# Patient Record
Sex: FEMALE | Race: WHITE | NOT HISPANIC OR LATINO
[De-identification: names, ages, dates, MRNs, and addresses within clinical notes are randomized per-mention and may not be internally consistent; named-entity substitution may affect disease eponyms.]

---

## 2018-10-22 ENCOUNTER — TRANSCRIPTION ENCOUNTER (OUTPATIENT)
Age: 43
End: 2018-10-22

## 2020-09-11 ENCOUNTER — APPOINTMENT (OUTPATIENT)
Dept: ANTEPARTUM | Facility: CLINIC | Age: 45
End: 2020-09-11
Payer: COMMERCIAL

## 2020-09-11 PROCEDURE — 76819 FETAL BIOPHYS PROFIL W/O NST: CPT

## 2020-09-11 PROCEDURE — 76816 OB US FOLLOW-UP PER FETUS: CPT

## 2020-09-18 ENCOUNTER — APPOINTMENT (OUTPATIENT)
Dept: ANTEPARTUM | Facility: CLINIC | Age: 45
End: 2020-09-18
Payer: COMMERCIAL

## 2020-09-18 PROCEDURE — 76819 FETAL BIOPHYS PROFIL W/O NST: CPT

## 2020-09-18 PROCEDURE — 76816 OB US FOLLOW-UP PER FETUS: CPT

## 2020-09-25 ENCOUNTER — APPOINTMENT (OUTPATIENT)
Dept: ANTEPARTUM | Facility: CLINIC | Age: 45
End: 2020-09-25
Payer: COMMERCIAL

## 2020-09-25 PROCEDURE — 76816 OB US FOLLOW-UP PER FETUS: CPT

## 2020-09-25 PROCEDURE — 76819 FETAL BIOPHYS PROFIL W/O NST: CPT

## 2020-10-02 ENCOUNTER — APPOINTMENT (OUTPATIENT)
Dept: ANTEPARTUM | Facility: CLINIC | Age: 45
End: 2020-10-02
Payer: COMMERCIAL

## 2020-10-02 PROCEDURE — 76818 FETAL BIOPHYS PROFILE W/NST: CPT

## 2020-10-05 ENCOUNTER — APPOINTMENT (OUTPATIENT)
Dept: ANTEPARTUM | Facility: CLINIC | Age: 45
End: 2020-10-05
Payer: COMMERCIAL

## 2020-10-05 PROCEDURE — 76816 OB US FOLLOW-UP PER FETUS: CPT

## 2020-10-05 PROCEDURE — 76818 FETAL BIOPHYS PROFILE W/NST: CPT

## 2020-10-09 ENCOUNTER — APPOINTMENT (OUTPATIENT)
Dept: ANTEPARTUM | Facility: CLINIC | Age: 45
End: 2020-10-09
Payer: COMMERCIAL

## 2020-10-09 PROCEDURE — 76816 OB US FOLLOW-UP PER FETUS: CPT

## 2020-10-09 PROCEDURE — 76818 FETAL BIOPHYS PROFILE W/NST: CPT

## 2020-10-12 ENCOUNTER — LABORATORY RESULT (OUTPATIENT)
Age: 45
End: 2020-10-12

## 2020-10-12 ENCOUNTER — APPOINTMENT (OUTPATIENT)
Dept: ANTEPARTUM | Facility: CLINIC | Age: 45
End: 2020-10-12
Payer: COMMERCIAL

## 2020-10-12 PROBLEM — Z00.00 ENCOUNTER FOR PREVENTIVE HEALTH EXAMINATION: Status: ACTIVE | Noted: 2020-10-12

## 2020-10-12 PROCEDURE — 76818 FETAL BIOPHYS PROFILE W/NST: CPT

## 2020-10-13 ENCOUNTER — INPATIENT (INPATIENT)
Facility: HOSPITAL | Age: 45
LOS: 3 days | Discharge: HOME | End: 2020-10-17
Attending: OBSTETRICS & GYNECOLOGY | Admitting: OBSTETRICS & GYNECOLOGY
Payer: COMMERCIAL

## 2020-10-13 ENCOUNTER — TRANSCRIPTION ENCOUNTER (OUTPATIENT)
Age: 45
End: 2020-10-13

## 2020-10-13 VITALS — WEIGHT: 189.6 LBS | HEIGHT: 68 IN

## 2020-10-13 LAB
ALBUMIN SERPL ELPH-MCNC: 3.3 G/DL — SIGNIFICANT CHANGE UP (ref 3.3–5)
ALP SERPL-CCNC: 83 U/L — SIGNIFICANT CHANGE UP (ref 40–120)
ALT FLD-CCNC: 17 U/L — SIGNIFICANT CHANGE UP (ref 10–45)
ANION GAP SERPL CALC-SCNC: 12 MMOL/L — SIGNIFICANT CHANGE UP (ref 5–17)
APPEARANCE UR: CLEAR — SIGNIFICANT CHANGE UP
APTT BLD: 24.5 SEC — LOW (ref 27.5–35.5)
AST SERPL-CCNC: 18 U/L — SIGNIFICANT CHANGE UP (ref 10–40)
BASOPHILS # BLD AUTO: 0.02 K/UL — SIGNIFICANT CHANGE UP (ref 0–0.2)
BASOPHILS NFR BLD AUTO: 0.3 % — SIGNIFICANT CHANGE UP (ref 0–2)
BILIRUB SERPL-MCNC: <0.2 MG/DL — SIGNIFICANT CHANGE UP (ref 0.2–1.2)
BILIRUB UR-MCNC: NEGATIVE — SIGNIFICANT CHANGE UP
BUN SERPL-MCNC: 11 MG/DL — SIGNIFICANT CHANGE UP (ref 7–23)
CALCIUM SERPL-MCNC: 9.5 MG/DL — SIGNIFICANT CHANGE UP (ref 8.4–10.5)
CHLORIDE SERPL-SCNC: 101 MMOL/L — SIGNIFICANT CHANGE UP (ref 96–108)
CO2 SERPL-SCNC: 21 MMOL/L — LOW (ref 22–31)
COLOR SPEC: YELLOW — SIGNIFICANT CHANGE UP
CREAT ?TM UR-MCNC: 87 MG/DL — SIGNIFICANT CHANGE UP
CREAT SERPL-MCNC: 0.5 MG/DL — SIGNIFICANT CHANGE UP (ref 0.5–1.3)
DIFF PNL FLD: NEGATIVE — SIGNIFICANT CHANGE UP
EOSINOPHIL # BLD AUTO: 0.06 K/UL — SIGNIFICANT CHANGE UP (ref 0–0.5)
EOSINOPHIL NFR BLD AUTO: 0.8 % — SIGNIFICANT CHANGE UP (ref 0–6)
FIBRINOGEN PPP-MCNC: 430 MG/DL — SIGNIFICANT CHANGE UP (ref 258–438)
GLUCOSE SERPL-MCNC: 80 MG/DL — SIGNIFICANT CHANGE UP (ref 70–99)
GLUCOSE UR QL: NEGATIVE — SIGNIFICANT CHANGE UP
HCT VFR BLD CALC: 35.5 % — SIGNIFICANT CHANGE UP (ref 34.5–45)
HGB BLD-MCNC: 11.7 G/DL — SIGNIFICANT CHANGE UP (ref 11.5–15.5)
IMM GRANULOCYTES NFR BLD AUTO: 0.5 % — SIGNIFICANT CHANGE UP (ref 0–1.5)
INR BLD: 0.83 — LOW (ref 0.88–1.16)
KETONES UR-MCNC: NEGATIVE — SIGNIFICANT CHANGE UP
LDH SERPL L TO P-CCNC: 148 U/L — SIGNIFICANT CHANGE UP (ref 50–242)
LEUKOCYTE ESTERASE UR-ACNC: NEGATIVE — SIGNIFICANT CHANGE UP
LYMPHOCYTES # BLD AUTO: 2.71 K/UL — SIGNIFICANT CHANGE UP (ref 1–3.3)
LYMPHOCYTES # BLD AUTO: 34.2 % — SIGNIFICANT CHANGE UP (ref 13–44)
MCHC RBC-ENTMCNC: 29.3 PG — SIGNIFICANT CHANGE UP (ref 27–34)
MCHC RBC-ENTMCNC: 33 GM/DL — SIGNIFICANT CHANGE UP (ref 32–36)
MCV RBC AUTO: 88.8 FL — SIGNIFICANT CHANGE UP (ref 80–100)
MONOCYTES # BLD AUTO: 0.59 K/UL — SIGNIFICANT CHANGE UP (ref 0–0.9)
MONOCYTES NFR BLD AUTO: 7.4 % — SIGNIFICANT CHANGE UP (ref 2–14)
NEUTROPHILS # BLD AUTO: 4.51 K/UL — SIGNIFICANT CHANGE UP (ref 1.8–7.4)
NEUTROPHILS NFR BLD AUTO: 56.8 % — SIGNIFICANT CHANGE UP (ref 43–77)
NITRITE UR-MCNC: NEGATIVE — SIGNIFICANT CHANGE UP
NRBC # BLD: 0 /100 WBCS — SIGNIFICANT CHANGE UP (ref 0–0)
PH UR: 6 — SIGNIFICANT CHANGE UP (ref 5–8)
PLATELET # BLD AUTO: 181 K/UL — SIGNIFICANT CHANGE UP (ref 150–400)
POTASSIUM SERPL-MCNC: 4.2 MMOL/L — SIGNIFICANT CHANGE UP (ref 3.5–5.3)
POTASSIUM SERPL-SCNC: 4.2 MMOL/L — SIGNIFICANT CHANGE UP (ref 3.5–5.3)
PROT ?TM UR-MCNC: 6 MG/DL — SIGNIFICANT CHANGE UP (ref 0–12)
PROT SERPL-MCNC: 6.4 G/DL — SIGNIFICANT CHANGE UP (ref 6–8.3)
PROT UR-MCNC: NEGATIVE MG/DL — SIGNIFICANT CHANGE UP
PROT/CREAT UR-RTO: 0.1 RATIO — SIGNIFICANT CHANGE UP (ref 0–0.2)
PROTHROM AB SERPL-ACNC: 10 SEC — LOW (ref 10.6–13.6)
RBC # BLD: 4 M/UL — SIGNIFICANT CHANGE UP (ref 3.8–5.2)
RBC # FLD: 13.1 % — SIGNIFICANT CHANGE UP (ref 10.3–14.5)
SODIUM SERPL-SCNC: 134 MMOL/L — LOW (ref 135–145)
SP GR SPEC: 1.02 — SIGNIFICANT CHANGE UP (ref 1–1.03)
URATE SERPL-MCNC: 4.9 MG/DL — SIGNIFICANT CHANGE UP (ref 2.5–7)
UROBILINOGEN FLD QL: 0.2 E.U./DL — SIGNIFICANT CHANGE UP
WBC # BLD: 7.93 K/UL — SIGNIFICANT CHANGE UP (ref 3.8–10.5)
WBC # FLD AUTO: 7.93 K/UL — SIGNIFICANT CHANGE UP (ref 3.8–10.5)

## 2020-10-13 RX ORDER — SODIUM CHLORIDE 9 MG/ML
1000 INJECTION, SOLUTION INTRAVENOUS
Refills: 0 | Status: DISCONTINUED | OUTPATIENT
Start: 2020-10-13 | End: 2020-10-14

## 2020-10-13 RX ORDER — CITRIC ACID/SODIUM CITRATE 300-500 MG
15 SOLUTION, ORAL ORAL EVERY 6 HOURS
Refills: 0 | Status: DISCONTINUED | OUTPATIENT
Start: 2020-10-13 | End: 2020-10-14

## 2020-10-13 RX ORDER — DINOPROSTONE 10 MG/241MG
10 INSERT VAGINAL ONCE
Refills: 0 | Status: COMPLETED | OUTPATIENT
Start: 2020-10-13 | End: 2020-10-13

## 2020-10-13 RX ORDER — OXYTOCIN 10 UNIT/ML
333.33 VIAL (ML) INJECTION
Qty: 20 | Refills: 0 | Status: DISCONTINUED | OUTPATIENT
Start: 2020-10-13 | End: 2020-10-14

## 2020-10-13 RX ADMIN — DINOPROSTONE 10 MILLIGRAM(S): 10 INSERT VAGINAL at 22:12

## 2020-10-13 RX ADMIN — SODIUM CHLORIDE 125 MILLILITER(S): 9 INJECTION, SOLUTION INTRAVENOUS at 22:13

## 2020-10-14 LAB
SARS-COV-2 IGG SERPL QL IA: NEGATIVE — SIGNIFICANT CHANGE UP
SARS-COV-2 IGM SERPL IA-ACNC: 0.09 INDEX — SIGNIFICANT CHANGE UP
T PALLIDUM AB TITR SER: NEGATIVE — SIGNIFICANT CHANGE UP

## 2020-10-14 RX ORDER — SIMETHICONE 80 MG/1
80 TABLET, CHEWABLE ORAL EVERY 4 HOURS
Refills: 0 | Status: DISCONTINUED | OUTPATIENT
Start: 2020-10-14 | End: 2020-10-17

## 2020-10-14 RX ORDER — MAGNESIUM HYDROXIDE 400 MG/1
30 TABLET, CHEWABLE ORAL
Refills: 0 | Status: DISCONTINUED | OUTPATIENT
Start: 2020-10-14 | End: 2020-10-17

## 2020-10-14 RX ORDER — ENOXAPARIN SODIUM 100 MG/ML
40 INJECTION SUBCUTANEOUS EVERY 24 HOURS
Refills: 0 | Status: DISCONTINUED | OUTPATIENT
Start: 2020-10-15 | End: 2020-10-17

## 2020-10-14 RX ORDER — DIPHENHYDRAMINE HCL 50 MG
25 CAPSULE ORAL EVERY 6 HOURS
Refills: 0 | Status: DISCONTINUED | OUTPATIENT
Start: 2020-10-14 | End: 2020-10-17

## 2020-10-14 RX ORDER — IBUPROFEN 200 MG
600 TABLET ORAL EVERY 6 HOURS
Refills: 0 | Status: COMPLETED | OUTPATIENT
Start: 2020-10-14 | End: 2021-09-12

## 2020-10-14 RX ORDER — LANOLIN
1 OINTMENT (GRAM) TOPICAL EVERY 6 HOURS
Refills: 0 | Status: DISCONTINUED | OUTPATIENT
Start: 2020-10-14 | End: 2020-10-17

## 2020-10-14 RX ORDER — CEFAZOLIN SODIUM 1 G
2000 VIAL (EA) INJECTION ONCE
Refills: 0 | Status: COMPLETED | OUTPATIENT
Start: 2020-10-14 | End: 2020-10-14

## 2020-10-14 RX ORDER — DEXAMETHASONE 0.5 MG/5ML
4 ELIXIR ORAL EVERY 6 HOURS
Refills: 0 | Status: DISCONTINUED | OUTPATIENT
Start: 2020-10-14 | End: 2020-10-17

## 2020-10-14 RX ORDER — CHLORHEXIDINE GLUCONATE 213 G/1000ML
1 SOLUTION TOPICAL DAILY
Refills: 0 | Status: DISCONTINUED | OUTPATIENT
Start: 2020-10-14 | End: 2020-10-14

## 2020-10-14 RX ORDER — TETANUS TOXOID, REDUCED DIPHTHERIA TOXOID AND ACELLULAR PERTUSSIS VACCINE, ADSORBED 5; 2.5; 8; 8; 2.5 [IU]/.5ML; [IU]/.5ML; UG/.5ML; UG/.5ML; UG/.5ML
0.5 SUSPENSION INTRAMUSCULAR ONCE
Refills: 0 | Status: DISCONTINUED | OUTPATIENT
Start: 2020-10-14 | End: 2020-10-17

## 2020-10-14 RX ORDER — ONDANSETRON 8 MG/1
4 TABLET, FILM COATED ORAL EVERY 6 HOURS
Refills: 0 | Status: DISCONTINUED | OUTPATIENT
Start: 2020-10-14 | End: 2020-10-17

## 2020-10-14 RX ORDER — ACETAMINOPHEN 500 MG
975 TABLET ORAL EVERY 6 HOURS
Refills: 0 | Status: DISCONTINUED | OUTPATIENT
Start: 2020-10-14 | End: 2020-10-17

## 2020-10-14 RX ORDER — OXYTOCIN 10 UNIT/ML
1 VIAL (ML) INJECTION
Qty: 30 | Refills: 0 | Status: DISCONTINUED | OUTPATIENT
Start: 2020-10-14 | End: 2020-10-14

## 2020-10-14 RX ORDER — CITRIC ACID/SODIUM CITRATE 300-500 MG
30 SOLUTION, ORAL ORAL ONCE
Refills: 0 | Status: COMPLETED | OUTPATIENT
Start: 2020-10-14 | End: 2020-10-14

## 2020-10-14 RX ORDER — NALOXONE HYDROCHLORIDE 4 MG/.1ML
0.1 SPRAY NASAL
Refills: 0 | Status: DISCONTINUED | OUTPATIENT
Start: 2020-10-14 | End: 2020-10-17

## 2020-10-14 RX ORDER — OXYTOCIN 10 UNIT/ML
333.33 VIAL (ML) INJECTION
Qty: 20 | Refills: 0 | Status: DISCONTINUED | OUTPATIENT
Start: 2020-10-14 | End: 2020-10-17

## 2020-10-14 RX ORDER — OXYCODONE HYDROCHLORIDE 5 MG/1
5 TABLET ORAL ONCE
Refills: 0 | Status: DISCONTINUED | OUTPATIENT
Start: 2020-10-14 | End: 2020-10-17

## 2020-10-14 RX ORDER — KETOROLAC TROMETHAMINE 30 MG/ML
30 SYRINGE (ML) INJECTION EVERY 6 HOURS
Refills: 0 | Status: DISCONTINUED | OUTPATIENT
Start: 2020-10-14 | End: 2020-10-15

## 2020-10-14 RX ORDER — SODIUM CHLORIDE 9 MG/ML
1000 INJECTION, SOLUTION INTRAVENOUS
Refills: 0 | Status: DISCONTINUED | OUTPATIENT
Start: 2020-10-14 | End: 2020-10-17

## 2020-10-14 RX ORDER — OXYCODONE HYDROCHLORIDE 5 MG/1
5 TABLET ORAL
Refills: 0 | Status: DISCONTINUED | OUTPATIENT
Start: 2020-10-14 | End: 2020-10-17

## 2020-10-14 RX ADMIN — Medication 30 MILLIGRAM(S): at 21:23

## 2020-10-14 RX ADMIN — CHLORHEXIDINE GLUCONATE 1 APPLICATION(S): 213 SOLUTION TOPICAL at 17:11

## 2020-10-14 RX ADMIN — Medication 2000 MILLIGRAM(S): at 17:12

## 2020-10-14 RX ADMIN — Medication 975 MILLIGRAM(S): at 22:51

## 2020-10-14 RX ADMIN — Medication 30 MILLILITER(S): at 17:12

## 2020-10-14 RX ADMIN — Medication 1000 MILLIUNIT(S)/MIN: at 19:12

## 2020-10-14 RX ADMIN — Medication 30 MILLIGRAM(S): at 21:35

## 2020-10-14 RX ADMIN — Medication 1 MILLIUNIT(S)/MIN: at 08:06

## 2020-10-15 ENCOUNTER — TRANSCRIPTION ENCOUNTER (OUTPATIENT)
Age: 45
End: 2020-10-15

## 2020-10-15 LAB
BASOPHILS # BLD AUTO: 0.01 K/UL — SIGNIFICANT CHANGE UP (ref 0–0.2)
BASOPHILS NFR BLD AUTO: 0.1 % — SIGNIFICANT CHANGE UP (ref 0–2)
EOSINOPHIL # BLD AUTO: 0 K/UL — SIGNIFICANT CHANGE UP (ref 0–0.5)
EOSINOPHIL NFR BLD AUTO: 0 % — SIGNIFICANT CHANGE UP (ref 0–6)
HCT VFR BLD CALC: 31.8 % — LOW (ref 34.5–45)
HGB BLD-MCNC: 10.5 G/DL — LOW (ref 11.5–15.5)
IMM GRANULOCYTES NFR BLD AUTO: 0.6 % — SIGNIFICANT CHANGE UP (ref 0–1.5)
LYMPHOCYTES # BLD AUTO: 1.74 K/UL — SIGNIFICANT CHANGE UP (ref 1–3.3)
LYMPHOCYTES # BLD AUTO: 16.1 % — SIGNIFICANT CHANGE UP (ref 13–44)
MCHC RBC-ENTMCNC: 29.6 PG — SIGNIFICANT CHANGE UP (ref 27–34)
MCHC RBC-ENTMCNC: 33 GM/DL — SIGNIFICANT CHANGE UP (ref 32–36)
MCV RBC AUTO: 89.6 FL — SIGNIFICANT CHANGE UP (ref 80–100)
MONOCYTES # BLD AUTO: 0.67 K/UL — SIGNIFICANT CHANGE UP (ref 0–0.9)
MONOCYTES NFR BLD AUTO: 6.2 % — SIGNIFICANT CHANGE UP (ref 2–14)
NEUTROPHILS # BLD AUTO: 8.34 K/UL — HIGH (ref 1.8–7.4)
NEUTROPHILS NFR BLD AUTO: 77 % — SIGNIFICANT CHANGE UP (ref 43–77)
NRBC # BLD: 0 /100 WBCS — SIGNIFICANT CHANGE UP (ref 0–0)
PLATELET # BLD AUTO: 153 K/UL — SIGNIFICANT CHANGE UP (ref 150–400)
RBC # BLD: 3.55 M/UL — LOW (ref 3.8–5.2)
RBC # FLD: 13.2 % — SIGNIFICANT CHANGE UP (ref 10.3–14.5)
WBC # BLD: 10.83 K/UL — HIGH (ref 3.8–10.5)
WBC # FLD AUTO: 10.83 K/UL — HIGH (ref 3.8–10.5)

## 2020-10-15 RX ORDER — IBUPROFEN 200 MG
600 TABLET ORAL EVERY 6 HOURS
Refills: 0 | Status: DISCONTINUED | OUTPATIENT
Start: 2020-10-15 | End: 2020-10-17

## 2020-10-15 RX ADMIN — Medication 1 TABLET(S): at 12:11

## 2020-10-15 RX ADMIN — Medication 30 MILLIGRAM(S): at 15:10

## 2020-10-15 RX ADMIN — Medication 975 MILLIGRAM(S): at 19:25

## 2020-10-15 RX ADMIN — SIMETHICONE 80 MILLIGRAM(S): 80 TABLET, CHEWABLE ORAL at 18:07

## 2020-10-15 RX ADMIN — Medication 30 MILLIGRAM(S): at 03:41

## 2020-10-15 RX ADMIN — Medication 975 MILLIGRAM(S): at 07:09

## 2020-10-15 RX ADMIN — Medication 975 MILLIGRAM(S): at 00:00

## 2020-10-15 RX ADMIN — Medication 975 MILLIGRAM(S): at 06:39

## 2020-10-15 RX ADMIN — Medication 30 MILLIGRAM(S): at 10:27

## 2020-10-15 RX ADMIN — Medication 30 MILLIGRAM(S): at 08:41

## 2020-10-15 RX ADMIN — Medication 975 MILLIGRAM(S): at 12:11

## 2020-10-15 RX ADMIN — Medication 975 MILLIGRAM(S): at 18:07

## 2020-10-15 RX ADMIN — Medication 30 MILLIGRAM(S): at 16:00

## 2020-10-15 RX ADMIN — Medication 600 MILLIGRAM(S): at 21:16

## 2020-10-15 RX ADMIN — Medication 975 MILLIGRAM(S): at 12:53

## 2020-10-15 RX ADMIN — ENOXAPARIN SODIUM 40 MILLIGRAM(S): 100 INJECTION SUBCUTANEOUS at 06:39

## 2020-10-15 RX ADMIN — Medication 600 MILLIGRAM(S): at 22:11

## 2020-10-15 RX ADMIN — Medication 30 MILLIGRAM(S): at 03:26

## 2020-10-15 NOTE — LACTATION INITIAL EVALUATION - LACTATION INTERVENTIONS
Provided breastfeeding education and assistance./initiate skin to skin/initiate hand expression routine

## 2020-10-15 NOTE — PROGRESS NOTE ADULT - SUBJECTIVE AND OBJECTIVE BOX
Patient is a 45y y.o. F now POD #1 s/p pLTCS.    NAEO. Patient was evaluated at bedside this AM. Pain is well-controlled with PO pain medications. Bray in place and she has not yet ambulated. She endorses decreasing vaginal bleeding. Has not yet passed gas.    Vital Signs Last 24 Hrs  T(C): 36.8 (15 Oct 2020 06:05), Max: 36.8 (15 Oct 2020 06:05)  T(F): 98.2 (15 Oct 2020 06:05), Max: 98.2 (15 Oct 2020 06:05)  HR: 66 (15 Oct 2020 06:05) (65 - 77)  BP: 95/56 (15 Oct 2020 06:05) (95/56 - 119/72)  BP(mean): --  RR: 17 (15 Oct 2020 06:05) (14 - 20)  SpO2: 96% (15 Oct 2020 06:05) (96% - 100%)    Physical Exam  Gen: Well-appearing. No acute distress. Resting comfortably in bed.  Resp: Breathing comfortably on RA.  Abd: Soft, non-tender, non-distended. Uterus firm at umbilicus.  Incision: Steri strips C/D/I  : Lochia WNL. Bray to gravity draining clear urine.  Extremities: No calf tenderness.    Labs                          10.5   10.83 )-----------( 153      ( 15 Oct 2020 06:16 )             31.8     10-13    134<L>  |  101  |  11  ----------------------------<  80  4.2   |  21<L>  |  0.50    Ca    9.5      13 Oct 2020 21:00    TPro  6.4  /  Alb  3.3  /  TBili  <0.2  /  DBili  x   /  AST  18  /  ALT  17  /  AlkPhos  83  10-13    PT/INR - ( 13 Oct 2020 21:00 )   PT: 10.0 sec;   INR: 0.83          PTT - ( 13 Oct 2020 21:00 )  PTT:24.5 sec        10-13-20 @ 07:01  -  10-14-20 @ 07:00  --------------------------------------------------------  IN: 1000 mL / OUT: 0 mL / NET: 1000 mL    10-14-20 @ 07:01  -  10-15-20 @ 06:41  --------------------------------------------------------  IN: 3550 mL / OUT: 2050 mL / NET: 1500 mL

## 2020-10-15 NOTE — PROGRESS NOTE ADULT - ASSESSMENT
Assessment/Plan    45y y.o. now POD#1 s/p pLTCS. AfVSS. Patient is progressing toward all post-op milestones. Pain is well-controlled on PO medications. Anticipate discharge tomorrow.    1. Pain  - Well-controlled on Motrin and Tylenol ATC    2. GI  - Tolerating CLD without n/v  - Senna PRN    3.   - Bray in place, to be removed today  - F/u TOV    4. DVT prophylaxis  - Encouraging ambulation    5. Dispo  - Anticipate dispo POD#2        Kae Burns MD PGY1

## 2020-10-15 NOTE — PROGRESS NOTE ADULT - SUBJECTIVE AND OBJECTIVE BOX
POD 1  Pt without complaints   VSS  Abd soft NT  Ext no calf tenderness  Inc c/d/i  Lochia Mod to mild    A: stable  P: advance diet and activity      cont care      Ant d/c home day 3

## 2020-10-15 NOTE — DISCHARGE NOTE OB - CARE PROVIDER_API CALL
MARIOLA GARCIA  OBSTETRICS AND GYNECOLOGY  [address] [place] [address], [place] [address]  Phone: [phone]  Fax: [phone]  Follow Up Time:

## 2020-10-15 NOTE — DISCHARGE NOTE OB - CARE PLAN
Goal:	to home  Assessment and plan of treatment:	see below   Principal Discharge DX:	Postpartum state  Goal:	to home  Assessment and plan of treatment:	see below

## 2020-10-15 NOTE — LACTATION INITIAL EVALUATION - NS LACT CON REASON FOR REQ
premature/compromised infant/primaparous mom/Infant about 9 hours old at time of consult. Mother is now P1. Infant is a 37 wk male delivered via c/s due to NRFT per mother. She stated she was induced due to IUGR but infant was "not tolerating labor". She stated that she was told by the doctor that infant might have to feed every hour due to the IUGR. Infant had not had significant feeding at the time of this visit. Assisted with latching infant via the football hold on the right breast. With repeated attempts infant eventually latched with a wide gape and lips flanged. Was observed to have intermittent burst of rhythmic sucking. Mother stated she could feel a strong pull with his sucks and denied having pain. Infant became sleepy at the breast. Assisted mother with hand expressing and syringe fed 1mL of colostrum to the infant. Infant was observed to have a suspected tongue tie as evidenced by a tight lingual frenulum. Spoke with mother about potential implications of both the tight frenulum and infant's GA on breast feeding and spoke about the possibility of pumping after feedings, mother agreed and will call her RN when she is ready. Mother encouraged to speak with her pediatrician regarding follow up evaluation by a pediatric ENT or dentist. Mother also stated she had been diagnosed with PCOS but a subsequent doctor said it was not accurate. Per mothert this was an IVF pregnancy not related to infertility. She confirmed having changes to her breasts during pregnancy.Infant has voided but is due to stool. Provided complete breastfeeding education. Demonstrated breast massage and hand expression and mother was able to return demo with colostrum noted. Reviewed pertinent information in the "[facility]" booklet. Mother does not have a breast pump at home and was provided with a manual one should she need it before she is able to obtain one through her insurance. Mother verbalized understanding of all information and denied having questions/concerns at this time. Informed about lactation availability.

## 2020-10-15 NOTE — DISCHARGE NOTE OB - PATIENT PORTAL LINK FT
You can access the FollowMyHealth Patient Portal offered by [facility] by registering at the following website: [email or web address]. By joining [employer]’s FollowMyHealth portal, you will also be able to view your health information using other applications (apps) compatible with our system.

## 2020-10-16 ENCOUNTER — APPOINTMENT (OUTPATIENT)
Dept: ANTEPARTUM | Facility: CLINIC | Age: 45
End: 2020-10-16

## 2020-10-16 RX ADMIN — Medication 975 MILLIGRAM(S): at 18:22

## 2020-10-16 RX ADMIN — Medication 600 MILLIGRAM(S): at 03:50

## 2020-10-16 RX ADMIN — Medication 975 MILLIGRAM(S): at 11:45

## 2020-10-16 RX ADMIN — Medication 600 MILLIGRAM(S): at 09:30

## 2020-10-16 RX ADMIN — Medication 600 MILLIGRAM(S): at 15:00

## 2020-10-16 RX ADMIN — Medication 975 MILLIGRAM(S): at 07:00

## 2020-10-16 RX ADMIN — Medication 975 MILLIGRAM(S): at 07:45

## 2020-10-16 RX ADMIN — ENOXAPARIN SODIUM 40 MILLIGRAM(S): 100 INJECTION SUBCUTANEOUS at 07:00

## 2020-10-16 RX ADMIN — Medication 600 MILLIGRAM(S): at 08:53

## 2020-10-16 RX ADMIN — Medication 600 MILLIGRAM(S): at 14:13

## 2020-10-16 RX ADMIN — Medication 600 MILLIGRAM(S): at 04:30

## 2020-10-16 RX ADMIN — Medication 1 TABLET(S): at 08:52

## 2020-10-16 RX ADMIN — Medication 600 MILLIGRAM(S): at 21:46

## 2020-10-16 RX ADMIN — Medication 600 MILLIGRAM(S): at 22:30

## 2020-10-16 RX ADMIN — Medication 975 MILLIGRAM(S): at 00:12

## 2020-10-16 RX ADMIN — Medication 975 MILLIGRAM(S): at 12:30

## 2020-10-16 RX ADMIN — Medication 975 MILLIGRAM(S): at 01:00

## 2020-10-16 NOTE — PROGRESS NOTE ADULT - SUBJECTIVE AND OBJECTIVE BOX
Patient is a 45y y.o. F now POD #2 s/p pLTCS.    NAEO. Patient was evaluated at bedside this AM. Pain is well-controlled with PO pain medications. She has been ambulating without difficulty and voiding spontaneously. She endorses decreasing vaginal bleeding. Passing gas.    Vital Signs Last 24 Hrs  T(C): 36.9 (16 Oct 2020 06:15), Max: 37.1 (15 Oct 2020 13:33)  T(F): 98.4 (16 Oct 2020 06:15), Max: 98.7 (15 Oct 2020 13:33)  HR: 81 (16 Oct 2020 06:15) (69 - 81)  BP: 107/73 (16 Oct 2020 06:15) (107/69 - 117/75)  BP(mean): --  RR: 18 (16 Oct 2020 06:15) (17 - 18)  SpO2: 95% (16 Oct 2020 06:15) (95% - 97%)    Physical Exam  Gen: Well-appearing. No acute distress. Resting comfortably in bed.  Resp: Breathing comfortably on RA.  Abd: Soft, non-tender, non-distended. Uterus firm at umbilicus.  Incision: Steri strips C/D/I  Extremities: No calf tenderness.    Labs                          10.5   10.83 )-----------( 153      ( 15 Oct 2020 06:16 )             31.8                   10-15-20 @ 07:01  -  10-16-20 @ 07:00  --------------------------------------------------------  IN: 0 mL / OUT: 800 mL / NET: -800 mL

## 2020-10-16 NOTE — PROGRESS NOTE ADULT - ASSESSMENT
Assessment/Plan    45y y.o. now POD#2  s/p pLTCS. AfVSS. Patient is progressing toward all post-op milestones. Pain is well-controlled on PO medications. Anticipate discharge today.    1. Pain  - Well-controlled on Motrin and Tylenol ATC    2. GI  - Tolerating regular diet without n/v  - Senna PRN    3.   - Voiding spontaneously without difficulty/pain    4. DVT prophylaxis  - Encouraging ambulation    5. Dispo  - Anticipate dispo POD#2        Kae Burns MD PGY1

## 2020-10-16 NOTE — PROGRESS NOTE ADULT - SUBJECTIVE AND OBJECTIVE BOX
POD 2 c/s  Pt without complaints  VSS  Abd soft NT  Lochia mild  Inc C/D/I  Ext no calf tenderness    A: stable  P: cont care      d/c home tomorrow if stable

## 2020-10-17 VITALS
HEART RATE: 82 BPM | OXYGEN SATURATION: 98 % | DIASTOLIC BLOOD PRESSURE: 75 MMHG | TEMPERATURE: 98 F | RESPIRATION RATE: 17 BRPM | SYSTOLIC BLOOD PRESSURE: 113 MMHG

## 2020-10-17 PROCEDURE — 84156 ASSAY OF PROTEIN URINE: CPT

## 2020-10-17 PROCEDURE — 86901 BLOOD TYPING SEROLOGIC RH(D): CPT

## 2020-10-17 PROCEDURE — 86850 RBC ANTIBODY SCREEN: CPT

## 2020-10-17 PROCEDURE — 85610 PROTHROMBIN TIME: CPT

## 2020-10-17 PROCEDURE — 82570 ASSAY OF URINE CREATININE: CPT

## 2020-10-17 PROCEDURE — 86900 BLOOD TYPING SEROLOGIC ABO: CPT

## 2020-10-17 PROCEDURE — 85384 FIBRINOGEN ACTIVITY: CPT

## 2020-10-17 PROCEDURE — 81003 URINALYSIS AUTO W/O SCOPE: CPT

## 2020-10-17 PROCEDURE — 80053 COMPREHEN METABOLIC PANEL: CPT

## 2020-10-17 PROCEDURE — 36415 COLL VENOUS BLD VENIPUNCTURE: CPT

## 2020-10-17 PROCEDURE — 83615 LACTATE (LD) (LDH) ENZYME: CPT

## 2020-10-17 PROCEDURE — 85025 COMPLETE CBC W/AUTO DIFF WBC: CPT

## 2020-10-17 PROCEDURE — 86769 SARS-COV-2 COVID-19 ANTIBODY: CPT

## 2020-10-17 PROCEDURE — 86780 TREPONEMA PALLIDUM: CPT

## 2020-10-17 PROCEDURE — 84550 ASSAY OF BLOOD/URIC ACID: CPT

## 2020-10-17 PROCEDURE — 85730 THROMBOPLASTIN TIME PARTIAL: CPT

## 2020-10-17 RX ADMIN — Medication 975 MILLIGRAM(S): at 07:33

## 2020-10-17 RX ADMIN — Medication 975 MILLIGRAM(S): at 01:51

## 2020-10-17 RX ADMIN — Medication 975 MILLIGRAM(S): at 11:04

## 2020-10-17 RX ADMIN — Medication 600 MILLIGRAM(S): at 04:30

## 2020-10-17 RX ADMIN — Medication 975 MILLIGRAM(S): at 01:03

## 2020-10-17 RX ADMIN — Medication 600 MILLIGRAM(S): at 03:36

## 2020-10-17 RX ADMIN — ENOXAPARIN SODIUM 40 MILLIGRAM(S): 100 INJECTION SUBCUTANEOUS at 07:32

## 2020-10-17 NOTE — PROGRESS NOTE ADULT - ASSESSMENT
Assessment/Plan    45y y.o. now POD#3  s/p pLTCS. AfVSS. Patient meeting all post-op milestones. Pain is well-controlled on PO medications. Anticipate discharge today.    1. Pain  - Well-controlled on Motrin and Tylenol ATC    2. GI  - Tolerating regular diet without n/v  - Senna PRN    3.   - Voiding spontaneously without difficulty/pain    4. DVT prophylaxis  - Encouraging ambulation    5. Dispo  - Home today

## 2020-10-17 NOTE — PROGRESS NOTE ADULT - SUBJECTIVE AND OBJECTIVE BOX
Patient is a 45y y.o. F now POD #3 s/p pLTCS.    NAEO. Patient was evaluated at bedside this AM. Pain is well-controlled with PO pain medications. She has been ambulating without difficulty and voiding spontaneously. She endorses decreasing vaginal bleeding. Passing gas.    Vital Signs Last 24 Hrs  T(C): 36.9 (16 Oct 2020 06:15), Max: 37.1 (15 Oct 2020 13:33)  T(F): 98.4 (16 Oct 2020 06:15), Max: 98.7 (15 Oct 2020 13:33)  HR: 81 (16 Oct 2020 06:15) (69 - 81)  BP: 107/73 (16 Oct 2020 06:15) (107/69 - 117/75)  RR: 18 (16 Oct 2020 06:15) (17 - 18)  SpO2: 95% (16 Oct 2020 06:15) (95% - 97%)    Physical Exam  Gen: Well-appearing. No acute distress. Resting comfortably in bed.  Resp: Breathing comfortably on RA.  Abd: Soft, non-tender, non-distended. Uterus firm at umbilicus.  Incision: Steri strips C/D/I  Extremities: No calf tenderness.    Labs                          10.5   10.83 )-----------( 153      ( 15 Oct 2020 06:16 )             31.8                   10-15-20 @ 07:01  -  10-16-20 @ 07:00  --------------------------------------------------------  IN: 0 mL / OUT: 800 mL / NET: -800 mL

## 2020-10-21 DIAGNOSIS — B00.9 HERPESVIRAL INFECTION, UNSPECIFIED: ICD-10-CM

## 2020-10-21 DIAGNOSIS — O36.5930 MATERNAL CARE FOR OTHER KNOWN OR SUSPECTED POOR FETAL GROWTH, THIRD TRIMESTER, NOT APPLICABLE OR UNSPECIFIED: ICD-10-CM

## 2020-10-23 ENCOUNTER — APPOINTMENT (OUTPATIENT)
Dept: ANTEPARTUM | Facility: CLINIC | Age: 45
End: 2020-10-23

## 2020-10-30 ENCOUNTER — APPOINTMENT (OUTPATIENT)
Dept: ANTEPARTUM | Facility: CLINIC | Age: 45
End: 2020-10-30

## 2022-11-23 ENCOUNTER — ASOB RESULT (OUTPATIENT)
Age: 47
End: 2022-11-23

## 2022-11-23 ENCOUNTER — APPOINTMENT (OUTPATIENT)
Dept: ANTEPARTUM | Facility: CLINIC | Age: 47
End: 2022-11-23

## 2022-11-23 PROCEDURE — 36415 COLL VENOUS BLD VENIPUNCTURE: CPT

## 2022-11-23 PROCEDURE — 76801 OB US < 14 WKS SINGLE FETUS: CPT

## 2022-11-23 PROCEDURE — 76813 OB US NUCHAL MEAS 1 GEST: CPT

## 2022-11-23 PROCEDURE — 93976 VASCULAR STUDY: CPT

## 2022-12-15 ENCOUNTER — APPOINTMENT (OUTPATIENT)
Dept: ANTEPARTUM | Facility: CLINIC | Age: 47
End: 2022-12-15

## 2022-12-15 ENCOUNTER — ASOB RESULT (OUTPATIENT)
Age: 47
End: 2022-12-15

## 2022-12-15 PROCEDURE — 76811 OB US DETAILED SNGL FETUS: CPT

## 2022-12-15 PROCEDURE — 76817 TRANSVAGINAL US OBSTETRIC: CPT

## 2023-04-20 ENCOUNTER — ASOB RESULT (OUTPATIENT)
Age: 48
End: 2023-04-20

## 2023-04-20 ENCOUNTER — APPOINTMENT (OUTPATIENT)
Dept: ANTEPARTUM | Facility: CLINIC | Age: 48
End: 2023-04-20
Payer: COMMERCIAL

## 2023-04-20 PROCEDURE — 76819 FETAL BIOPHYS PROFIL W/O NST: CPT

## 2023-04-20 PROCEDURE — 76816 OB US FOLLOW-UP PER FETUS: CPT
